# Patient Record
Sex: FEMALE | Race: WHITE
[De-identification: names, ages, dates, MRNs, and addresses within clinical notes are randomized per-mention and may not be internally consistent; named-entity substitution may affect disease eponyms.]

---

## 2020-03-31 ENCOUNTER — HOSPITAL ENCOUNTER (OUTPATIENT)
Dept: HOSPITAL 95 - OLS | Age: 50
Discharge: HOME | End: 2020-03-31
Attending: PHYSICIAN ASSISTANT
Payer: COMMERCIAL

## 2020-03-31 DIAGNOSIS — E10.9: Primary | ICD-10-CM

## 2020-03-31 LAB
CREAT UR-MCNC: 190 MG/DL (ref 27–270)
MICROALBUMIN UR-MCNC: 20.3 MG/L (ref 0–20)
MICROALBUMIN/CREAT UR: 10.68 MG/G (ref 0–30)

## 2020-05-11 ENCOUNTER — HOSPITAL ENCOUNTER (OUTPATIENT)
Dept: HOSPITAL 95 - LAB SHORT | Age: 50
Discharge: HOME | End: 2020-05-11
Attending: PHYSICIAN ASSISTANT
Payer: COMMERCIAL

## 2020-05-11 DIAGNOSIS — N39.0: Primary | ICD-10-CM

## 2020-05-28 ENCOUNTER — HOSPITAL ENCOUNTER (OUTPATIENT)
Dept: HOSPITAL 95 - LAB SHORT | Age: 50
Discharge: HOME | End: 2020-05-28
Attending: NURSE PRACTITIONER
Payer: COMMERCIAL

## 2020-05-28 DIAGNOSIS — N30.00: Primary | ICD-10-CM

## 2020-06-14 ENCOUNTER — HOSPITAL ENCOUNTER (EMERGENCY)
Dept: HOSPITAL 95 - ER | Age: 50
Discharge: HOME | End: 2020-06-14
Payer: COMMERCIAL

## 2020-06-14 VITALS — HEIGHT: 66 IN | BODY MASS INDEX: 22.02 KG/M2 | WEIGHT: 137 LBS

## 2020-06-14 DIAGNOSIS — Z88.0: ICD-10-CM

## 2020-06-14 DIAGNOSIS — Z88.8: ICD-10-CM

## 2020-06-14 DIAGNOSIS — E10.9: ICD-10-CM

## 2020-06-14 DIAGNOSIS — S93.401A: Primary | ICD-10-CM

## 2020-06-14 DIAGNOSIS — Z79.899: ICD-10-CM
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
## 2020-06-14 DIAGNOSIS — F17.210: ICD-10-CM

## 2020-06-14 DIAGNOSIS — Z88.1: ICD-10-CM

## 2020-11-06 ENCOUNTER — HOSPITAL ENCOUNTER (EMERGENCY)
Dept: HOSPITAL 95 - ER | Age: 50
Discharge: HOME | End: 2020-11-06
Payer: COMMERCIAL

## 2020-11-06 VITALS — BODY MASS INDEX: 24.11 KG/M2 | WEIGHT: 150 LBS | HEIGHT: 66 IN
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
## 2020-11-06 DIAGNOSIS — Z88.0: ICD-10-CM

## 2020-11-06 DIAGNOSIS — E10.9: ICD-10-CM

## 2020-11-06 DIAGNOSIS — F17.210: ICD-10-CM

## 2020-11-06 DIAGNOSIS — S61.212A: Primary | ICD-10-CM

## 2020-11-06 DIAGNOSIS — Z23: ICD-10-CM

## 2020-11-06 DIAGNOSIS — Z88.8: ICD-10-CM

## 2020-11-18 ENCOUNTER — HOSPITAL ENCOUNTER (EMERGENCY)
Dept: HOSPITAL 95 - ER | Age: 50
Discharge: HOME | End: 2020-11-18
Payer: COMMERCIAL

## 2020-11-18 VITALS — WEIGHT: 142 LBS | BODY MASS INDEX: 22.82 KG/M2 | HEIGHT: 66 IN

## 2020-11-18 DIAGNOSIS — F17.210: ICD-10-CM
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
## 2020-11-18 DIAGNOSIS — L03.012: ICD-10-CM

## 2020-11-18 DIAGNOSIS — S61.211D: Primary | ICD-10-CM

## 2020-11-18 DIAGNOSIS — E10.9: ICD-10-CM

## 2020-11-18 DIAGNOSIS — Z88.0: ICD-10-CM

## 2020-11-18 DIAGNOSIS — Z88.8: ICD-10-CM

## 2021-01-08 ENCOUNTER — HOSPITAL ENCOUNTER (OUTPATIENT)
Dept: HOSPITAL 95 - ORSCSDS | Age: 51
Discharge: HOME | End: 2021-01-08
Payer: COMMERCIAL

## 2021-01-08 VITALS — HEIGHT: 65.98 IN | WEIGHT: 140.21 LBS | BODY MASS INDEX: 22.53 KG/M2

## 2021-01-08 DIAGNOSIS — F17.210: ICD-10-CM

## 2021-01-08 DIAGNOSIS — Z79.4: ICD-10-CM

## 2021-01-08 DIAGNOSIS — D12.2: ICD-10-CM

## 2021-01-08 DIAGNOSIS — D50.9: Primary | ICD-10-CM

## 2021-01-08 DIAGNOSIS — E11.9: ICD-10-CM

## 2021-01-08 DIAGNOSIS — K20.90: ICD-10-CM

## 2021-01-08 DIAGNOSIS — K29.70: ICD-10-CM

## 2021-01-08 DIAGNOSIS — K44.9: ICD-10-CM

## 2021-01-08 DIAGNOSIS — K64.8: ICD-10-CM

## 2021-01-08 DIAGNOSIS — Z79.899: ICD-10-CM

## 2021-01-08 PROCEDURE — 0DBP8ZX EXCISION OF RECTUM, VIA NATURAL OR ARTIFICIAL OPENING ENDOSCOPIC, DIAGNOSTIC: ICD-10-PCS | Performed by: INTERNAL MEDICINE

## 2021-01-08 PROCEDURE — 0DB68ZX EXCISION OF STOMACH, VIA NATURAL OR ARTIFICIAL OPENING ENDOSCOPIC, DIAGNOSTIC: ICD-10-PCS | Performed by: INTERNAL MEDICINE

## 2021-01-08 PROCEDURE — 0DBK8ZX EXCISION OF ASCENDING COLON, VIA NATURAL OR ARTIFICIAL OPENING ENDOSCOPIC, DIAGNOSTIC: ICD-10-PCS | Performed by: INTERNAL MEDICINE

## 2021-01-08 PROCEDURE — 0DB98ZX EXCISION OF DUODENUM, VIA NATURAL OR ARTIFICIAL OPENING ENDOSCOPIC, DIAGNOSTIC: ICD-10-PCS | Performed by: INTERNAL MEDICINE

## 2021-01-08 NOTE — NUR
01/08/21 1417 Mey Guerrero
PT REQUESTED TO HAVE HER CBG CHECKED SO SHE COULD RECALIBRATE HER
PUMP. DR. BENSON GAVE A VERBAL ORDER TO GO AHEAD WITH CHECKING HER CBG.

## 2021-07-26 ENCOUNTER — HOSPITAL ENCOUNTER (INPATIENT)
Dept: HOSPITAL 95 - ER | Age: 51
LOS: 2 days | Discharge: HOME | DRG: 637 | End: 2021-07-28
Attending: INTERNAL MEDICINE | Admitting: HOSPITALIST
Payer: COMMERCIAL

## 2021-07-26 VITALS — WEIGHT: 152.34 LBS | HEIGHT: 66 IN | BODY MASS INDEX: 24.48 KG/M2

## 2021-07-26 DIAGNOSIS — R65.11: ICD-10-CM

## 2021-07-26 DIAGNOSIS — Z88.0: ICD-10-CM

## 2021-07-26 DIAGNOSIS — E87.1: ICD-10-CM

## 2021-07-26 DIAGNOSIS — Z88.8: ICD-10-CM

## 2021-07-26 DIAGNOSIS — N17.9: ICD-10-CM

## 2021-07-26 DIAGNOSIS — Z79.4: ICD-10-CM

## 2021-07-26 DIAGNOSIS — Z98.890: ICD-10-CM

## 2021-07-26 DIAGNOSIS — D72.829: ICD-10-CM

## 2021-07-26 DIAGNOSIS — G92: ICD-10-CM

## 2021-07-26 DIAGNOSIS — G43.909: ICD-10-CM

## 2021-07-26 DIAGNOSIS — F17.200: ICD-10-CM

## 2021-07-26 DIAGNOSIS — E86.0: ICD-10-CM

## 2021-07-26 DIAGNOSIS — I95.9: ICD-10-CM

## 2021-07-26 DIAGNOSIS — Z88.1: ICD-10-CM

## 2021-07-26 DIAGNOSIS — E10.10: Primary | ICD-10-CM

## 2021-07-26 LAB
ALBUMIN SERPL BCP-MCNC: 4 G/DL (ref 3.4–5)
ALBUMIN/GLOB SERPL: 1.4 {RATIO} (ref 0.8–1.8)
ALT SERPL W P-5'-P-CCNC: 25 U/L (ref 12–78)
ANION GAP SERPL CALCULATED.4IONS-SCNC: 24 MMOL/L (ref 6–16)
ANION GAP SERPL CALCULATED.4IONS-SCNC: 28 MMOL/L (ref 6–16)
ANION GAP SERPL CALCULATED.4IONS-SCNC: 8 MMOL/L (ref 6–16)
ANION GAP SERPL CALCULATED.4IONS-SCNC: 9 MMOL/L (ref 6–16)
AST SERPL W P-5'-P-CCNC: 16 U/L (ref 12–37)
BASOPHILS # BLD AUTO: 0.21 K/MM3 (ref 0–0.23)
BASOPHILS NFR BLD AUTO: 1 % (ref 0–2)
BILIRUB SERPL-MCNC: 1 MG/DL (ref 0.1–1)
BUN SERPL-MCNC: 20 MG/DL (ref 8–24)
BUN SERPL-MCNC: 23 MG/DL (ref 8–24)
BUN SERPL-MCNC: 28 MG/DL (ref 8–24)
BUN SERPL-MCNC: 31 MG/DL (ref 8–24)
CALCIUM SERPL-MCNC: 7 MG/DL (ref 8.5–10.1)
CALCIUM SERPL-MCNC: 7.5 MG/DL (ref 8.5–10.1)
CALCIUM SERPL-MCNC: 7.9 MG/DL (ref 8.5–10.1)
CALCIUM SERPL-MCNC: 9.2 MG/DL (ref 8.5–10.1)
CHLORIDE SERPL-SCNC: 101 MMOL/L (ref 98–108)
CHLORIDE SERPL-SCNC: 112 MMOL/L (ref 98–108)
CHLORIDE SERPL-SCNC: 112 MMOL/L (ref 98–108)
CHLORIDE SERPL-SCNC: 87 MMOL/L (ref 98–108)
CO2 SERPL-SCNC: 14 MMOL/L (ref 21–32)
CO2 SERPL-SCNC: 16 MMOL/L (ref 21–32)
CO2 SERPL-SCNC: 6 MMOL/L (ref 21–32)
CO2 SERPL-SCNC: 6 MMOL/L (ref 21–32)
CREAT SERPL-MCNC: 0.69 MG/DL (ref 0.4–1)
CREAT SERPL-MCNC: 0.78 MG/DL (ref 0.4–1)
CREAT SERPL-MCNC: 1.05 MG/DL (ref 0.4–1)
CREAT SERPL-MCNC: 1.11 MG/DL (ref 0.4–1)
DEPRECATED RDW RBC AUTO: 43.7 FL (ref 35.1–46.3)
EOSINOPHIL # BLD AUTO: 0.02 K/MM3 (ref 0–0.68)
EOSINOPHIL NFR BLD AUTO: 0 % (ref 0–6)
ERYTHROCYTE [DISTWIDTH] IN BLOOD BY AUTOMATED COUNT: 12.1 % (ref 11.7–14.2)
GLOBULIN SER CALC-MCNC: 2.9 G/DL (ref 2.2–4)
GLUCOSE SERPL-MCNC: 244 MG/DL (ref 70–99)
GLUCOSE SERPL-MCNC: 272 MG/DL (ref 70–99)
GLUCOSE SERPL-MCNC: 511 MG/DL (ref 70–99)
GLUCOSE SERPL-MCNC: 617 MG/DL (ref 70–99)
GLUCOSE SERPL-MCNC: 759 MG/DL (ref 70–99)
GLUCOSE UR-MCNC: (no result) MG/DL
HCT VFR BLD AUTO: 38.7 % (ref 33–51)
HGB BLD-MCNC: 12.8 G/DL (ref 11.5–16)
IMM GRANULOCYTES # BLD AUTO: 0.96 K/MM3 (ref 0–0.1)
IMM GRANULOCYTES NFR BLD AUTO: 3 % (ref 0–1)
KETONES UR STRIP-MCNC: (no result) MG/DL
LYMPHOCYTES # BLD AUTO: 2.41 K/MM3 (ref 0.84–5.2)
LYMPHOCYTES NFR BLD AUTO: 9 % (ref 21–46)
MAGNESIUM SERPL-MCNC: 2.4 MG/DL (ref 1.6–2.4)
MCHC RBC AUTO-ENTMCNC: 33.1 G/DL (ref 31.5–36.5)
MCV RBC AUTO: 98 FL (ref 80–100)
MONOCYTES # BLD AUTO: 2.01 K/MM3 (ref 0.16–1.47)
MONOCYTES NFR BLD AUTO: 7 % (ref 4–13)
NEUTROPHILS # BLD AUTO: 22.25 K/MM3 (ref 1.96–9.15)
NEUTROPHILS NFR BLD AUTO: 80 % (ref 41–73)
NRBC # BLD AUTO: 0 K/MM3 (ref 0–0.02)
NRBC BLD AUTO-RTO: 0 /100 WBC (ref 0–0.2)
PH BLDV: 6.99 [PH] (ref 7.34–7.37)
PH BLDV: 7 [PH] (ref 7.34–7.37)
PHOSPHATE SERPL-MCNC: 7.3 MG/DL (ref 2.5–4.9)
PLATELET # BLD AUTO: 279 K/MM3 (ref 150–400)
POTASSIUM SERPL-SCNC: 3.9 MMOL/L (ref 3.5–5.5)
POTASSIUM SERPL-SCNC: 4 MMOL/L (ref 3.5–5.5)
POTASSIUM SERPL-SCNC: 4.6 MMOL/L (ref 3.5–5.5)
POTASSIUM SERPL-SCNC: 5.6 MMOL/L (ref 3.5–5.5)
PROT SERPL-MCNC: 6.9 G/DL (ref 6.4–8.2)
PROT UR STRIP-MCNC: (no result) MG/DL
SODIUM SERPL-SCNC: 121 MMOL/L (ref 136–145)
SODIUM SERPL-SCNC: 131 MMOL/L (ref 136–145)
SODIUM SERPL-SCNC: 135 MMOL/L (ref 136–145)
SODIUM SERPL-SCNC: 136 MMOL/L (ref 136–145)
SP GR SPEC: 1.02 (ref 1–1.02)
UROBILINOGEN UR STRIP-MCNC: (no result) MG/DL
WBC #/AREA URNS HPF: (no result) /HPF (ref 0–5)

## 2021-07-26 PROCEDURE — A9270 NON-COVERED ITEM OR SERVICE: HCPCS

## 2021-07-26 PROCEDURE — C1751 CATH, INF, PER/CENT/MIDLINE: HCPCS

## 2021-07-26 NOTE — NUR
Shift Summary
 
Pt continued to be hypotensive, KITTY Morales calld and recieved new orders, see
emar and orders. Dr. Rajan consulted, provider made aware in person. Pt
started on Levophed GTT, see flow sheet. Right AC IV not patent and attempted
two peripheral IV's. Charge nurse Lisa made aware and powerglide placed by
charge nurse. Ok per charge to infuse Levophed via powerglide. Will let
nightshift know to attempt another IV. Insulin GTT infusing, see flow sheet.
Pt bladder scanned and floey placed per provider, tolerated well. NSR. See
vital sign flow sheet.
 
Aunt, Vi at bedside after transfer from ER. Aunt states she will update
everyone in the family. Spoke to patient mother and updated on care being
provided as well.

## 2021-07-26 NOTE — NUR
Provider Call- MAP 50's
 
KITTY Morales called and updated on pts MAPs. New orders recieved to start 1 L
bolus of NS and VBG. Will start bolus.

## 2021-07-26 NOTE — NUR
PT REMAINS VERY LETHARGIC, ANSWERS BRIEFLY AND FOLLOWS COMMANDS, BUT DOESN'T
OPEN HER EYES OR SPEAK OUT. SHE DENIES HEADACHE, N/V, STOMACH ACHE. D5NS IS
INFUSING @ 200ML/HR, INSULIN @ 4U/HR. SUGARS REMAINING UNDER 250. BARRERA
DRAINING YELLOW RETURN, NO VISIBLE SEDIMENT. LEFT FOREARM SITE CONTINUES WITH
INSULIN AND D5NS AND RIGHT PG WITH LEVO.

## 2021-07-26 NOTE — NUR
Spoke to daughter -ETOH
 
Spoke to daughter, Lory via phone. Daughter states pt has been drinking hard
ETOH and is considered. Will let provider know. Updated on current care being
provided. Pt gave permission to speak to daughter. Currently pt drowsy and
sleeping. Will let nightshift know.
 
[phone] Ramón Henley

## 2021-07-26 NOTE — NUR
Update-
 
Pt sleeping but easily awakens. Continues to be A/O X 4. Able to turn self in
bed and remains on RA. SpO2 > 90%.

## 2021-07-26 NOTE — NUR
New admit ER to ICU 1
 
Pt admitted for DKA. On insulin GTT @ 8 u/hr. A/O to location, event, and
following directions, drowsy at times. Able to turn self in bed, transfered
with three persmission assitance from ED bed to ICU bed. States being cold,
warm blankets provided. Pt hypotensive, see previous note. Recieved critical
results of cbg 617 from lab, going in correct direction. Sinus tach. Pts aunt
at bedside, Vi, phone number provided as she is the main family contact. Call
light within reach.

## 2021-07-27 LAB
ANION GAP SERPL CALCULATED.4IONS-SCNC: 5 MMOL/L (ref 6–16)
ANION GAP SERPL CALCULATED.4IONS-SCNC: 7 MMOL/L (ref 6–16)
BASOPHILS # BLD AUTO: 0.06 K/MM3 (ref 0–0.23)
BASOPHILS NFR BLD AUTO: 0 % (ref 0–2)
BUN SERPL-MCNC: 15 MG/DL (ref 8–24)
BUN SERPL-MCNC: 18 MG/DL (ref 8–24)
CALCIUM SERPL-MCNC: 7.2 MG/DL (ref 8.5–10.1)
CALCIUM SERPL-MCNC: 7.3 MG/DL (ref 8.5–10.1)
CHLORIDE SERPL-SCNC: 113 MMOL/L (ref 98–108)
CHLORIDE SERPL-SCNC: 114 MMOL/L (ref 98–108)
CO2 SERPL-SCNC: 17 MMOL/L (ref 21–32)
CO2 SERPL-SCNC: 19 MMOL/L (ref 21–32)
CREAT SERPL-MCNC: 0.65 MG/DL (ref 0.4–1)
CREAT SERPL-MCNC: 0.69 MG/DL (ref 0.4–1)
DEPRECATED RDW RBC AUTO: 40.4 FL (ref 35.1–46.3)
EOSINOPHIL # BLD AUTO: 0.03 K/MM3 (ref 0–0.68)
EOSINOPHIL NFR BLD AUTO: 0 % (ref 0–6)
ERYTHROCYTE [DISTWIDTH] IN BLOOD BY AUTOMATED COUNT: 12.2 % (ref 11.7–14.2)
GLUCOSE SERPL-MCNC: 155 MG/DL (ref 70–99)
GLUCOSE SERPL-MCNC: 210 MG/DL (ref 70–99)
HCT VFR BLD AUTO: 32.5 % (ref 33–51)
HGB BLD-MCNC: 11.3 G/DL (ref 11.5–16)
IMM GRANULOCYTES # BLD AUTO: 0.26 K/MM3 (ref 0–0.1)
IMM GRANULOCYTES NFR BLD AUTO: 1 % (ref 0–1)
LYMPHOCYTES # BLD AUTO: 2.81 K/MM3 (ref 0.84–5.2)
LYMPHOCYTES NFR BLD AUTO: 11 % (ref 21–46)
MCHC RBC AUTO-ENTMCNC: 34.8 G/DL (ref 31.5–36.5)
MCV RBC AUTO: 91 FL (ref 80–100)
MONOCYTES # BLD AUTO: 1.81 K/MM3 (ref 0.16–1.47)
MONOCYTES NFR BLD AUTO: 7 % (ref 4–13)
NEUTROPHILS # BLD AUTO: 21.02 K/MM3 (ref 1.96–9.15)
NEUTROPHILS NFR BLD AUTO: 81 % (ref 41–73)
NRBC # BLD AUTO: 0 K/MM3 (ref 0–0.02)
NRBC BLD AUTO-RTO: 0 /100 WBC (ref 0–0.2)
PLATELET # BLD AUTO: 311 K/MM3 (ref 150–400)
POTASSIUM SERPL-SCNC: 3.6 MMOL/L (ref 3.5–5.5)
POTASSIUM SERPL-SCNC: 3.6 MMOL/L (ref 3.5–5.5)
SODIUM SERPL-SCNC: 137 MMOL/L (ref 136–145)
SODIUM SERPL-SCNC: 138 MMOL/L (ref 136–145)

## 2021-07-27 NOTE — NUR
PT RESTING IN BED. A/OX4, DENIES PAIN, N/V, AND SOB. ON LEVOPHED GTT THAT IS
TITRATING DOWN AND INSULIN GTT. NEGATIVE CIWA. NO COMPLAINTS.

## 2021-07-27 NOTE — NUR
SUMMARY:
ROSENDA SLEPT THROUGH THE NIGHT. SHE WOULD AWAKEN AND CONVERSE MORE THROUGHOUT
THE NIGHT. SHE ADMITTED THAT SHE WAS DRINKING ON SUNDAY DURING THE DAY, THAT
HER SUGARS WERE OK IN THE MORNING AND THEN BEGAN CLIMBING LATER. HER INSULIN
GTT IS AT 4U/HR, LEVOPHED @ 9MCG, D5NS @ 200ML/HR. BARRERA TO GRAVITY DRAINAGE,
LESS THAN 600ML OUT THIS SHIFT. SHE STATES THAT OVERALL SHE IS FEELING BETTER
THAN SHE DID, BUT STILL NOT GOOD. SHE DENIES HEADACHE, NAUSEA/VOMITING, PAIN,
BLURRY VISION. SHE CONTINUES TO BE SLEEPY BUT IS MORE ENGAGING WITH STAFF. SHE
DENIES ANY NEEDS AT THIS TIME. CONTINUES NPO, CBG @ [phone].

## 2021-07-27 NOTE — NUR
TRANSFER NOTE
HANDOFF RECEIVED FROM ICU RN ISAIAH. PT ARRIVED TO FLOOR VIA WHEELCHAIR.
PERSONAL POSSESSIONS WITH PT. PT ORIENTED TO UNIT

## 2021-07-27 NOTE — NUR
SUMMARY
PT RESTING IN BED. A/O X4. TITRATED OFF LEVOHED TODAY AND DOING WELL. INSULIN
GTT AND D5 NS WAS STOPPED FOR A SHORT TIME THIS AFTERNOON. PT WAS MADE MEDICAL
STATUS THEN CBG WENT UP 'S. DR. GE WANTED PT BACK ON INSULIN GTT
UNTIL 1 HOUR AFTER LONG ACTING INSULIN GIVEN. PT IS  AND RECEIVED LONG
ACTING, SLIDING SCALE, AND 7 UNITS OF NOVOLOG WITH MEAL. PT IS STAYING IN ICU
UNTIL AFTER MEAL AND TO MAKE SURE BLOOD SUGAR STABILIZES. PT DID NOT BRING
HOME INSULIN PUMP AND NO ONE CAN BRING IT TO HER. PT EATING DINNER NOW, NO
SIGN OF DISTRESS.

## 2021-07-27 NOTE — NUR
SPOKE TO DR. BOWMAN, UPDATED ON BLOOD SUGARS, AND INSULIN DOSING ON EMAR, AND
NO HS ORDER, OR INSULIN ORDER FOR HS.  UPDATED DR. BOWMAN PT IS A MEDICAL
FLOOR STATUS CHANGE.  SHE REPORTED SHE WOULD PLACE ORDERS.

## 2021-07-27 NOTE — NUR
PT HAS A POOR APPETITE.  PT DENIES ANY HEADACHE, CHEST PAIN, SOB, NAUSEA, OR
NUMBNESS AND TINGLING.  PT IN BED IN HIGH SEMI FOWLERS POSITION, HAS BEEN
TALKING ON THE TELEPHONE.  UPDATED PT ON Q 2 HOUR CBG CHECKS TONIGHT - PT
REPORTS THIS IS "GOOD" AS SHE WAS CONCERNED HER BLOOD SUGAR MAY DROP WITH THE
INSULIN GIVEN TONIGHT.  FLUIDS AT BEDSIDE.  CALL LIGHT WITHIN REACH.  PT TO
TRANSFER TO ROOM 328 TONIGHT - UPDATED PT ON THIS.

## 2021-07-27 NOTE — NUR
ROSENDA WOKE UP, TALKING A BIT MORE, DENIES HA,N/V,ABD PAIN, BLURRED VISION.
STATES SHE IS FEELING BETTER. STATES SHE FEELS THAT SHE HAS TO "PEE". REMINDED
OF HER CATHETER AND HOW IT CAN FEEL THAT WAY. SHE STATES HER BLOOD PRESSURE
NORMALLY RUNS IN 'S/50'S.

## 2021-07-28 LAB
ANION GAP SERPL CALCULATED.4IONS-SCNC: 5 MMOL/L (ref 6–16)
BASOPHILS # BLD AUTO: 0.02 K/MM3 (ref 0–0.23)
BASOPHILS NFR BLD AUTO: 0 % (ref 0–2)
BUN SERPL-MCNC: 8 MG/DL (ref 8–24)
CALCIUM SERPL-MCNC: 7.9 MG/DL (ref 8.5–10.1)
CHLORIDE SERPL-SCNC: 114 MMOL/L (ref 98–108)
CO2 SERPL-SCNC: 23 MMOL/L (ref 21–32)
CREAT SERPL-MCNC: 0.52 MG/DL (ref 0.4–1)
DEPRECATED RDW RBC AUTO: 42.6 FL (ref 35.1–46.3)
EOSINOPHIL # BLD AUTO: 0.08 K/MM3 (ref 0–0.68)
EOSINOPHIL NFR BLD AUTO: 1 % (ref 0–6)
ERYTHROCYTE [DISTWIDTH] IN BLOOD BY AUTOMATED COUNT: 12.9 % (ref 11.7–14.2)
GLUCOSE SERPL-MCNC: 191 MG/DL (ref 70–99)
HCT VFR BLD AUTO: 29.7 % (ref 33–51)
HGB BLD-MCNC: 10.4 G/DL (ref 11.5–16)
IMM GRANULOCYTES # BLD AUTO: 0.02 K/MM3 (ref 0–0.1)
IMM GRANULOCYTES NFR BLD AUTO: 0 % (ref 0–1)
LYMPHOCYTES # BLD AUTO: 1.4 K/MM3 (ref 0.84–5.2)
LYMPHOCYTES NFR BLD AUTO: 23 % (ref 21–46)
MCHC RBC AUTO-ENTMCNC: 35 G/DL (ref 31.5–36.5)
MCV RBC AUTO: 91 FL (ref 80–100)
MONOCYTES # BLD AUTO: 0.49 K/MM3 (ref 0.16–1.47)
MONOCYTES NFR BLD AUTO: 8 % (ref 4–13)
NEUTROPHILS # BLD AUTO: 4.04 K/MM3 (ref 1.96–9.15)
NEUTROPHILS NFR BLD AUTO: 67 % (ref 41–73)
NRBC # BLD AUTO: 0 K/MM3 (ref 0–0.02)
NRBC BLD AUTO-RTO: 0 /100 WBC (ref 0–0.2)
PLATELET # BLD AUTO: 136 K/MM3 (ref 150–400)
POTASSIUM SERPL-SCNC: 3.6 MMOL/L (ref 3.5–5.5)
SODIUM SERPL-SCNC: 142 MMOL/L (ref 136–145)

## 2021-07-28 NOTE — NUR
PATIENT DISCHARGED TO HOME ACCOMPANIED BY HER AUNT. IV SALINE LOCK AND
POWERGLIDE IV REMOVED WITHOUT INCIDENT. VERBALIZED UNDERSTANDING OF D/C
INSTRUCTIONS. OFF UNIT VIA W/C AT 1648. NO BELONGINGS LEFT BEHIND IN ROOM.

## 2021-07-28 NOTE — NUR
SHIFT SUMMARY
TRANSFERED FROM ICU THIS SHIFT. ADMITTED FOR DKA. FULL CODE. CHEMBG'S ARE ACHS
NOW. CIWA'S Q 4HRS. INSULIN LOW SS. SHE IS A STANDBY ASSIST TO THE BATHROOM.
SHE USES AN INSULIN PUMP AT HOME. BARRERA IN PLACE FOR RETENTION. DR ROGERS IS
PULMONARY CONSULT. SHE IS A&O X4. NO NEW CONCERNS THIS SHIFT.

## 2021-11-14 ENCOUNTER — HOSPITAL ENCOUNTER (EMERGENCY)
Dept: HOSPITAL 95 - ER | Age: 51
Discharge: HOME | End: 2021-11-14
Payer: COMMERCIAL

## 2021-11-14 VITALS — WEIGHT: 139.99 LBS | BODY MASS INDEX: 22.5 KG/M2 | HEIGHT: 66 IN

## 2021-11-14 DIAGNOSIS — F17.210: ICD-10-CM
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
## 2021-11-14 DIAGNOSIS — Z88.1: ICD-10-CM

## 2021-11-14 DIAGNOSIS — G43.909: ICD-10-CM

## 2021-11-14 DIAGNOSIS — Z79.899: ICD-10-CM

## 2021-11-14 DIAGNOSIS — Z88.8: ICD-10-CM

## 2021-11-14 DIAGNOSIS — S61.230A: Primary | ICD-10-CM

## 2021-11-14 DIAGNOSIS — E10.9: ICD-10-CM

## 2021-11-14 DIAGNOSIS — Z88.0: ICD-10-CM

## 2021-11-14 PROCEDURE — A9270 NON-COVERED ITEM OR SERVICE: HCPCS
